# Patient Record
Sex: MALE | Race: WHITE
[De-identification: names, ages, dates, MRNs, and addresses within clinical notes are randomized per-mention and may not be internally consistent; named-entity substitution may affect disease eponyms.]

---

## 2018-06-11 NOTE — DIAGNOSTIC IMAGING REPORT
INDICATION: Lifting injury as well as laceration to the left

fifth finger.



TIME OF EXAM: 12:22 PM



3 views of the left fifth finger were obtained.



FINDINGS: The alignment of the fifth finger is normal. The

phalanges appear intact. No fractures are seen. The soft tissues

are unremarkable. No radiopaque soft tissue foreign body is seen.



IMPRESSION: No acute abnormality is detected.



Dictated by: 



  Dictated on workstation # [id number]

## 2018-06-11 NOTE — ED UPPER EXTREMITY
General


Stated Complaint:  LT PINKY LAC


Source:  patient


Exam Limitations:  no limitations





History of Present Illness


Date Seen by Provider:  Jun 11, 2018


Time Seen by Provider:  11:52


Initial Comments


to ER with a laceration to the ulnar side middle phalanx left pinky finger 

after getting it caught in a chain at work. He does not wish to file a workman'

s comp. Tetanus is up-to-date.


Onset:  just prior to arrival


Severity:  moderate


Pain/Injury Location:  left 5th finger


Modifying Factors:  Worse With Movement





Allergies and Home Medications


Allergies


Coded Allergies:  


     No Known Drug Allergies (Unverified , 6/11/18)





Home Medications


No Active Prescriptions or Reported Meds





Patient Home Medication List


Home Medication List Reviewed:  Yes





Constitutional:  see HPI


EENTM:  see HPI


Respiratory:  no symptoms reported


Cardiovascular:  no symptoms reported


Genitourinary:  no symptoms reported


Musculoskeletal:  no symptoms reported


Skin:  see HPI


Psychiatric/Neurological:  No Symptoms Reported





Past Medical-Social-Family Hx


Patient Social History


Recent Foreign Travel:  No


Contact w/Someone Who Travel:  No





Physical Exam


Vital Signs





Vital Signs - First Documented








 6/11/18





 11:45


 


Temp 97.4


 


Pulse 88


 


Resp 18


 


B/P (MAP) 141/87 (105)


 


Pulse Ox 100





Capillary Refill :


General Appearance:  WD/WN, no apparent distress


HEENT:  PERRL/EOMI, normal ENT inspection


Neck:  non-tender, full range of motion


Respiratory:  no respiratory distress, no accessory muscle use


Gastrointestinal:  normal bowel sounds, non tender


Shoulder:  normal inspection, non-tender


Elbow/Forearm:  normal inspection, non-tender


Hand:  Left, laceration (superficial skin avulsion to the middle phalanx ulnar 

side left fifth finger with some swelling to the middle phalanx as well.)


Neurologic/Psychiatric:  alert, normal mood/affect, oriented x 3


Skin:  normal color, warm/dry





Progress/Results/Core Measures


Results/Orders


My Orders





Orders - SHANNON SMITH


Finger(S) (6/11/18 11:51)





Vital Signs/I&O











 6/11/18





 11:45


 


Temp 97.4


 


Pulse 88


 


Resp 18


 


B/P (MAP) 141/87 (105)


 


Pulse Ox 100











Departure


Impression





 Primary Impression:  


 Skin avulsion


Disposition:  01 HOME, SELF-CARE


Condition:  Stable





Departure-Patient Inst.


Decision time for Depature:  12:26


Referrals:  


NO,LOCAL PHYSICIAN (PCP/Family)


Primary Care Physician


Patient Instructions:  SKIN AVULSION





Add. Discharge Instructions:  


1. Return to ER for any concerns


2. Tylenol and Motrin for any pain.All discharge instructions reviewed with 

patient and/or family. Voiced understanding.


Scripts


No Active Prescriptions or Reported Meds











SHANNON SMITH Jun 11, 2018 11:55

## 2022-05-16 ENCOUNTER — HOSPITAL ENCOUNTER (EMERGENCY)
Dept: HOSPITAL 75 - ER | Age: 27
Discharge: HOME | End: 2022-05-16
Payer: SELF-PAY

## 2022-05-16 VITALS — WEIGHT: 179.9 LBS | HEIGHT: 72.05 IN | BODY MASS INDEX: 24.37 KG/M2

## 2022-05-16 VITALS — SYSTOLIC BLOOD PRESSURE: 129 MMHG | DIASTOLIC BLOOD PRESSURE: 69 MMHG

## 2022-05-16 DIAGNOSIS — H10.811: Primary | ICD-10-CM

## 2022-05-16 PROCEDURE — 90715 TDAP VACCINE 7 YRS/> IM: CPT

## 2022-05-16 PROCEDURE — 99284 EMERGENCY DEPT VISIT MOD MDM: CPT

## 2022-05-16 NOTE — ED EENT
History of Present Illness


General


Chief Complaint:  Eye Problems


Stated Complaint:  POSSIBLE METAL IN RIGHT EYE


Source:  patient


Exam Limitations:  no limitations





History of Present Illness


Date Seen by Provider:  May 16, 2022


Time Seen by Provider:  13:40


Initial Comments


Was using a sawsall on saturday 5/14/22 when a piece of metal flicked into his 

right eye. His vision is unchanged, he denies any foreign body sensation. 

Tetanus is not up to date. He does notice a small reddened area to the medial 

right eye.


Timing/Duration:  abrupt


Severity:  mild


Location:  eye (R)


Prearrival Treatment:  no prearrival treatment


Associated Symptoms:  denies symptoms





Allergies and Home Medications


Allergies


Coded Allergies:  


     No Known Drug Allergies (Unverified , 6/11/18)





Patient Home Medication List


Home Medication List Reviewed:  Yes


Ketorolac Tromethamine (Acular) 0.5 % Drops, 1 DROP OP TID


   Prescribed by: SHANNON SMITH on 5/16/22 1348





Review of Systems


Review of Systems


Constitutional:  see HPI


Eyes:  See HPI


Ears:  No Symptoms Reported


Nose:  no symptoms reported


Mouth:  no symptoms reported


Throat:  no symptoms reported


Respiratory:  no symptoms reported


Cardiovascular:  no symptoms reported


Musculoskeletal:  no symptoms reported


Skin:  no symptoms reported


Neurological:  No Symptoms Reported


Hematologic/Lymphatic:  No Symptoms Reported


Immunological/Allergic:  no symptoms reported





Past Medical-Social-Family Hx


Seasonal Allergies


Seasonal Allergies:  No





Past Medical History


Surgeries:  No


Respiratory:  No


Cardiac:  No


Neurological:  No


Genitourinary:  No


Gastrointestinal:  No


Musculoskeletal:  No


Endocrine:  No


HEENT:  No


Cancer:  No


Psychosocial:  No


Integumentary:  No


Blood Disorders:  No





Physical Exam


Vital Signs





Vital Signs - First Documented








 5/16/22





 13:35


 


Temp 36.2


 


Pulse 82


 


Resp 18


 


B/P (MAP) 129/69 (89)


 


Pulse Ox 98


 


O2 Delivery Room Air








Height, Weight, BMI


Height: 6'1.00"


Weight: 180lbs. oz. 81.868848jj;  BMI


Method:Stated


General Appearance:  WD/WN, no apparent distress


Eyes:  right eye other (There is a small area of conjunctival inflammation on 

the bulbar conjunctive a at about the 3 to 4 o'clock position of the right eye, 

appearance similar to a pterygium though it does not extendon to the cornea); 

bilateral eye PERRL, bilateral eye EOMI


Ears:  bilateral ear auricle normal, bilateral ear canal normal, bilateral ear 

TM normal


Neck:  non-tender, full range of motion


Respiratory:  no respiratory distress, no accessory muscle use


Gastrointestinal:  normal bowel sounds, non tender


Neurologic/Psychiatric:  alert, normal mood/affect, oriented x 3


Skin:  normal color, warm/dry





Progress/Results/Core Measures


Results/Orders


My Orders





Orders - SHANNON SMITH


Tetracaine  0.5% Ophth Sol Sdv (Tetracai (5/16/22 13:45)


Fluorescein Strips (Fluor-I-Strips) (5/16/22 13:45)


Balanced Salt Irrigation Soln (Bss Irrig (5/16/22 13:45)





Vital Signs/I&O











 5/16/22





 13:35


 


Temp 36.2


 


Pulse 82


 


Resp 18


 


B/P (MAP) 129/69 (89)


 


Pulse Ox 98


 


O2 Delivery Room Air











Departure


Impression





   Primary Impression:  


   Pingueculitis of right eye


Disposition:  01 HOME, SELF-CARE


Condition:  Stable





Departure-Patient Inst.


Decision time for Depature:  13:46


Referrals:  


KAMILAH BAUTISTA OD, SHANE R OD NO,LOCAL PHYSICIAN (PCP)


Primary Care Physician


Patient Instructions:  NO INSTRUCTIONS GIVEN





Add. Discharge Instructions:  


.  Apply the topical anti-inflammatory drops as directed.  Return to ER for any 

concerns.  Call the eye doctor of your choosing [place] to be seen for 

follow-up.





All discharge instructions reviewed with patient and/or family. Voiced 

understanding.


Scripts


Ketorolac Tromethamine (Acular) 0.5 % Drops


1 DROP OP TID, #1 EA


   Prov: SHANNON SMITH         5/16/22











SHANNON SMITH             May 16, 2022 13:43

## 2023-03-08 ENCOUNTER — HOSPITAL ENCOUNTER (EMERGENCY)
Dept: HOSPITAL 75 - ER FS | Age: 28
Discharge: HOME | End: 2023-03-08
Payer: SELF-PAY

## 2023-03-08 VITALS — WEIGHT: 199.96 LBS | HEIGHT: 73.03 IN | BODY MASS INDEX: 26.5 KG/M2

## 2023-03-08 VITALS — SYSTOLIC BLOOD PRESSURE: 122 MMHG | DIASTOLIC BLOOD PRESSURE: 74 MMHG

## 2023-03-08 DIAGNOSIS — R07.81: Primary | ICD-10-CM

## 2023-03-08 DIAGNOSIS — F17.210: ICD-10-CM

## 2023-03-08 PROCEDURE — 71046 X-RAY EXAM CHEST 2 VIEWS: CPT

## 2023-03-08 NOTE — ED GENERAL
General


Stated Complaint:  PAIN R SIDE LUNG AREA


Source of Information:  Patient


Exam Limitations:  No Limitations





History of Present Illness


Date Seen by Provider:  Mar 8, 2023


Time Seen by Provider:  20:42


Initial Comments


27-year-old male presents to the emergency department today for right lateral 

chest wall pain.  Symptoms started 4 days ago and have been persistent.  He 

states it feels like it is difficult to take a deep breath as well.  Pain is 

described as sharp, stabbing and worse with deep inspiration.  Points to his 

right lower lung field as the location of the pain but it is also worked its way

up in the last couple of days.  No fevers or chills.  He has had a mild 

nonproductive cough.  He has never had similar symptoms in the past.  No 

unilateral lower extremity pain, swelling, recent surgeries, long distance 

travel.  No self or family history of DVT, PE.  He does smoke cigarettes





All other systems reviewed and negative except documented per HPI.





Voice recognition software was used to help create this chart





Allergies and Home Medications


Allergies


Coded Allergies:  


     No Known Drug Allergies (Unverified , 6/11/18)





Patient Home Medication List


Home Medication List Reviewed:  Yes


Ketorolac Tromethamine (Acular) 0.5 % Drops, 1 DROP OP TID


   Prescribed by: SHANNON SMITH on 5/16/22 6952





Review of Systems


Review of Systems


Constitutional:  no symptoms reported





Past Medical-Social-Family Hx


Patient Social History


Tobacco Use?:  Yes


Tobacco type used:  Cigarettes


Use of E-Cig and/or Vaping dev:  No


Substance use?:  No


Alcohol Use?:  No





Immunizations Up To Date


First/Initial COVID19 Vaccinat:  n/a





Seasonal Allergies


Seasonal Allergies:  No





Past Medical History


Surgery/Hospitalization HX:  


testicular cancer


Surgeries:  No


Respiratory:  No


Cardiac:  No


Neurological:  No


Genitourinary:  No


Gastrointestinal:  No


Musculoskeletal:  No


Endocrine:  No


HEENT:  No


Cancer:  No


Psychosocial:  No


Integumentary:  No


Blood Disorders:  No





Family Medical History


Reviewed Nursing Family Hx


No Pertinent Family Hx





Physical Exam


Vital Signs





Vital Signs - First Documented








 3/8/23





 20:42


 


Temp 36.7


 


Pulse 87


 


Resp 22


 


B/P (MAP) 136/68 (90)


 


Pulse Ox 99


 


O2 Delivery Room Air





Capillary Refill :


Height, Weight, BMI


Height: 6'1.00"


Weight: 180lbs. oz. 81.433187gu; 24.00 BMI


Method:Stated


General Appearance:  No Apparent Distress, WD/WN


HEENT:  Normal ENT Inspection, Pharynx Normal


Neck:  Full Range of Motion, Normal Inspection, Non Tender, Supple


Respiratory:  Chest Non Tender, Lungs Clear, Normal Breath Sounds, No Accessory 

Muscle Use, No Respiratory Distress


Cardiovascular:  Regular Rate, Rhythm, No Edema, No Murmur, Normal Peripheral 

Pulses


Gastrointestinal:  Normal Bowel Sounds, No Organomegaly, No Pulsatile Mass, Non 

Tender, Soft


Back:  Normal Inspection, No CVA Tenderness, No Vertebral Tenderness


Extremity:  Normal Capillary Refill, Normal Inspection, Normal Range of Motion, 

Non Tender, No Calf Tenderness, No Pedal Edema


Neurologic/Psychiatric:  Alert, Oriented x3, Normal Mood/Affect


Skin:  Normal Color, Warm/Dry





Progress/Results/Core Measures


Suspected Sepsis


SIRS


Temperature: 


Pulse:  


Respiratory Rate: 


 


Blood Pressure  / 


Mean:





Results/Orders


My Orders





Orders - MATAHUEY DO


Chest Pa/Lat (2 View) (3/8/23 20:47)


Ketorolac Injection (Toradol Injection) (3/8/23 21:30)





Vital Signs/I&O











 3/8/23 3/8/23





 20:42 20:42


 


Temp  36.7


 


Pulse  87


 


Resp  22


 


B/P (MAP)  136/68 (90)


 


Pulse Ox  99


 


O2 Delivery Room Air Room Air





Capillary Refill :





Departure


Communication (Admissions)


Patient is hemodynamically stable.  Differential diagnosis includes 

pneumothorax, pneumonia, bronchitis, pleuritic chest pain, PE.  He has no risk 

factors for PE, negative PERC criteria.  I have independently reviewed and 

reviewed chest x-ray and it is completely clear with no evidence for pneumonia, 

pneumothorax.  He is coughing at the bedside I think he likely has pleurisy with

bronchitis symptoms present for only 4 days, no indication for antibiotics at 

this time.  He is given a shot of Toradol IM and discharged with prescription 

for p.o. Toradol.  I did advise him if the symptoms continue he should be 

evaluated further.  He states understanding.  He is discharged in stable 

condition.





Impression





   Primary Impression:  


   Pleuritic chest pain


Disposition:  01 HOME, SELF-CARE


Condition:  Stable





Departure-Patient Inst.


Referrals:  


NO,LOCAL PHYSICIAN (PCP/Family)


Primary Care Physician


Patient Instructions:  Pleuritic Chest Pain





Add. Discharge Instructions:  


I think you have pleurisy related to bronchitis.  Please use the anti-

inflammatory medicine, Toradol, as needed.  Do not take other anti-inflammatory 

medicines while you are taking this.  Bronchitis is a viral illness.  There is 

no evidence for bacterial source at this time however her symptoms last more 

than 10 to 14 days antibiotics may be indicated.  Return to the emergency 

department for any severe concerns or follow-up with your primary doctor for any

nonemergent needs.


Scripts


Ketorolac Tromethamine (Ketorolac Tromethamine) 10 Mg Tablet


10 MG PO TID for Pain for 3 Days, #9 TAB


   Prov: HUEY AUGUST DO         3/8/23











HUEY AUGUST DO             Mar 8, 2023 20:50

## 2023-03-08 NOTE — DIAGNOSTIC IMAGING REPORT
INDICATION: Chest pain, shortness of breath



FINDINGS:



The lungs are clear. No failure, effusion or pneumothorax.



IMPRESSION:



No acute appearing abnormality



Dictated by: 



  Dictated on workstation # [id number]